# Patient Record
Sex: FEMALE | Race: WHITE | NOT HISPANIC OR LATINO | Employment: PART TIME | URBAN - NONMETROPOLITAN AREA
[De-identification: names, ages, dates, MRNs, and addresses within clinical notes are randomized per-mention and may not be internally consistent; named-entity substitution may affect disease eponyms.]

---
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
## 2020-01-17 ENCOUNTER — OFFICE VISIT (OUTPATIENT)
Dept: SURGERY | Facility: CLINIC | Age: 20
End: 2020-01-17

## 2020-01-17 VITALS
BODY MASS INDEX: 21.79 KG/M2 | RESPIRATION RATE: 16 BRPM | HEIGHT: 63 IN | DIASTOLIC BLOOD PRESSURE: 72 MMHG | WEIGHT: 123 LBS | HEART RATE: 72 BPM | SYSTOLIC BLOOD PRESSURE: 118 MMHG

## 2020-01-17 DIAGNOSIS — L98.8 DYSPLASTIC SKIN LESION: Primary | ICD-10-CM

## 2020-01-17 PROCEDURE — 99213 OFFICE O/P EST LOW 20 MIN: CPT | Performed by: SURGERY

## 2020-01-17 NOTE — PROGRESS NOTES
"Nikki Varghese 19 y.o. female presents as a self ref for eval skin lesion under LEFT breast, neck, and RIGHT thigh.  Pt states she has multiple skin lesions and moles and has never seen derm.  Pt would like a derm ref for full body scan.   Chief Complaint   Patient presents with   • Skin Lesion     MULTIPLE ENTIRE BODY             HPI   Above noted and agree.   These lesions have been changing and are causing irritation.  The chest lesion has grown.  They are different shades of brown.  She has no fevers or chills.  She has no other symptoms.      Review of Systems        No current outpatient medications on file.        No Known Allergies        Past Medical History:   Diagnosis Date   • Headache            Past Surgical History:   Procedure Laterality Date   • MOLE REMOVAL             Social History     Tobacco Use   • Smoking status: Never Smoker   • Smokeless tobacco: Never Used   Substance Use Topics   • Alcohol use: No   • Drug use: No             There is no immunization history on file for this patient.        Physical Exam   Constitutional: She is oriented to person, place, and time. She appears well-developed and well-nourished.   HENT:   Head: Normocephalic and atraumatic.   Right Ear: External ear normal.   Left Ear: External ear normal.   Neurological: She is alert and oriented to person, place, and time.   Skin:   Behind her neck, on her chest, and on her leg are dysplastic appearing skin lesions.     Psychiatric: She has a normal mood and affect. Her behavior is normal.       Debilities/Disabilities Identified: None    Emotional Behavior: Appropriate      /72   Pulse 72   Resp 16   Ht 160 cm (63\")   Wt 55.8 kg (123 lb)   BMI 21.79 kg/m²         Nikki was seen today for skin lesion.    Diagnoses and all orders for this visit:    Dysplastic skin lesion    As soon as we receive a PA, we will bring her back and excise them.    Thank you for allowing me to participate in the care of this " interesting patient.

## 2020-02-03 ENCOUNTER — PROCEDURE VISIT (OUTPATIENT)
Dept: SURGERY | Facility: CLINIC | Age: 20
End: 2020-02-03

## 2020-02-03 DIAGNOSIS — L98.8 DYSPLASTIC SKIN LESION: Primary | ICD-10-CM

## 2020-02-03 PROBLEM — R10.2 PELVIC PAIN: Status: RESOLVED | Noted: 2019-02-25 | Resolved: 2020-02-03

## 2020-02-03 PROBLEM — Z11.3 SCREEN FOR STD (SEXUALLY TRANSMITTED DISEASE): Status: RESOLVED | Noted: 2019-02-25 | Resolved: 2020-02-03

## 2020-02-03 PROCEDURE — 11400 EXC TR-EXT B9+MARG 0.5 CM<: CPT | Performed by: SURGERY

## 2020-02-03 PROCEDURE — 11200 RMVL SKIN TAGS UP TO&INC 15: CPT | Performed by: SURGERY

## 2020-02-03 NOTE — PROGRESS NOTES
Nikki Varghese 19 y.o. female presents for excision 3 skin lesions.  LEFT breast, RIGHT thigh, post neck.   Chief Complaint   Patient presents with   • Skin Lesion     LEFT breast, RIGHT thigh, post neck             HPI   Above noted and agree.  The neck lesion is a skin tag.  The abdomen and thigh lesions have changed in size and color.        Review of Systems        No current outpatient medications on file.        No Known Allergies        Past Medical History:   Diagnosis Date   • Headache            Past Surgical History:   Procedure Laterality Date   • MOLE REMOVAL             Social History     Tobacco Use   • Smoking status: Never Smoker   • Smokeless tobacco: Never Used   Substance Use Topics   • Alcohol use: No   • Drug use: No             There is no immunization history on file for this patient.        Physical Exam     I discussed with the patient the benefits and risks of excising this lesion.   Risks not limited to but including bleeding, infection, having to excise more if the lesion turns out to be cancer.  The patient appeared to understand and signed informed consent.  The area was prepped and draped in the usual sterile fashion.  Local was injected into the area to be excised.  The lesion was then excised.  Hemostasis was perfected.  The wound was then closed using simple sutures.  Sterile dressings were applied.  The patient tolerated the procedure well without complication.  Wound care instructions were then given to the patient.  The abdomen lesion measured 0.5 cm x 0.5 cm x 0.5 cm and the thigh lesion measured 0.5 cm x 0.5 cm x 0.5 cm.  The skin tag on the neck was < 0.2 cm.    Debilities/Disabilities Identified: None    Emotional Behavior: Appropriate      There were no vitals taken for this visit.        Nikki was seen today for skin lesion.    Diagnoses and all orders for this visit:    Dysplastic skin lesion    Thank you for allowing me to participate in the care of this  interesting patient.

## 2020-02-06 LAB
DX ICD CODE: NORMAL
PATH REPORT.FINAL DX SPEC: NORMAL
PATH REPORT.GROSS SPEC: NORMAL
PATH REPORT.SITE OF ORIGIN SPEC: NORMAL
PATHOLOGIST NAME: NORMAL
PAYMENT PROCEDURE: NORMAL

## 2020-02-07 ENCOUNTER — TELEPHONE (OUTPATIENT)
Dept: SURGERY | Facility: CLINIC | Age: 20
End: 2020-02-07

## 2020-02-07 NOTE — TELEPHONE ENCOUNTER
"Nikki called the office this morning regarding her stitches that she was told she could take out today.  She said that she didn't feel like they were 100% ready to be taken out and wanted to know if she could have one of our(mine, Dr. Cerda or Emily Campos) cell phone numbers so she could send a picture of the stitches and us look to see if they what thought.  She said that she has had stitches before and these dont look as healed as the others she has had in the past. She said she wasn't able to come in the office today because she has to work from 1030am-7pm. I explained to her that Dr. Stallings and Emily Medina were at another location and that I would contact them and call her back.    Before I was able to get a response the patient called back saying she needed to get ready for work and wanted to know what she should do before getting fully ready.  She again requested if she could have my cell # and send my a picture, not as a patient to staff, but just as a friend and wanted me to tell her my thoughts on it.  I explained to her that we arent allowed to give our cell #'s out, she said \"well why your office has mine\".   With asking the patient, I placed her on hold and contacted Dr. Stallings and explained the situation.    After speaking with Dr. Stallings she had me advise the patient that she could wait until Sunday or Monday to remove the stitches and come Monday if she still questionable about them, she could call our office and we would see her on Monday.    Nikki was made aware of this and understood and agreeable.  "

## 2022-08-24 ENCOUNTER — HOSPITAL ENCOUNTER (EMERGENCY)
Facility: HOSPITAL | Age: 22
Discharge: HOME | End: 2022-08-24
Attending: EMERGENCY MEDICINE | Admitting: EMERGENCY MEDICINE

## 2022-08-24 ENCOUNTER — APPOINTMENT (OUTPATIENT)
Dept: CT IMAGING | Facility: HOSPITAL | Age: 22
End: 2022-08-24

## 2022-08-24 VITALS
DIASTOLIC BLOOD PRESSURE: 57 MMHG | BODY MASS INDEX: 22.86 KG/M2 | SYSTOLIC BLOOD PRESSURE: 98 MMHG | RESPIRATION RATE: 16 BRPM | WEIGHT: 129 LBS | HEIGHT: 63 IN | HEART RATE: 72 BPM | TEMPERATURE: 98.5 F | OXYGEN SATURATION: 99 %

## 2022-08-24 DIAGNOSIS — R10.9 RIGHT SIDED ABDOMINAL PAIN: Primary | ICD-10-CM

## 2022-08-24 LAB
ALBUMIN SERPL-MCNC: 4.8 G/DL (ref 3.5–5.2)
ALBUMIN/GLOB SERPL: 2.5 G/DL
ALP SERPL-CCNC: 30 U/L (ref 39–117)
ALT SERPL W P-5'-P-CCNC: 12 U/L (ref 1–33)
ANION GAP SERPL CALCULATED.3IONS-SCNC: 11 MMOL/L (ref 5–15)
AST SERPL-CCNC: 19 U/L (ref 1–32)
BASOPHILS # BLD AUTO: 0.05 10*3/MM3 (ref 0–0.2)
BASOPHILS NFR BLD AUTO: 0.7 % (ref 0–1.5)
BILIRUB SERPL-MCNC: 1 MG/DL (ref 0–1.2)
BILIRUB UR QL STRIP: NEGATIVE
BUN SERPL-MCNC: 7 MG/DL (ref 6–20)
BUN/CREAT SERPL: 8.8 (ref 7–25)
CALCIUM SPEC-SCNC: 9.4 MG/DL (ref 8.6–10.5)
CHLORIDE SERPL-SCNC: 104 MMOL/L (ref 98–107)
CLARITY UR: CLEAR
CO2 SERPL-SCNC: 24 MMOL/L (ref 22–29)
COLOR UR: YELLOW
CREAT SERPL-MCNC: 0.8 MG/DL (ref 0.57–1)
DEPRECATED RDW RBC AUTO: 41.3 FL (ref 37–54)
EGFRCR SERPLBLD CKD-EPI 2021: 107 ML/MIN/1.73
EOSINOPHIL # BLD AUTO: 0.16 10*3/MM3 (ref 0–0.4)
EOSINOPHIL NFR BLD AUTO: 2.4 % (ref 0.3–6.2)
ERYTHROCYTE [DISTWIDTH] IN BLOOD BY AUTOMATED COUNT: 12.4 % (ref 12.3–15.4)
GLOBULIN UR ELPH-MCNC: 1.9 GM/DL
GLUCOSE SERPL-MCNC: 93 MG/DL (ref 65–99)
GLUCOSE UR STRIP-MCNC: NEGATIVE MG/DL
HCG SERPL QL: NEGATIVE
HCT VFR BLD AUTO: 40.1 % (ref 34–46.6)
HGB BLD-MCNC: 13.1 G/DL (ref 12–15.9)
HGB UR QL STRIP.AUTO: NEGATIVE
IMM GRANULOCYTES # BLD AUTO: 0.01 10*3/MM3 (ref 0–0.05)
IMM GRANULOCYTES NFR BLD AUTO: 0.1 % (ref 0–0.5)
KETONES UR QL STRIP: ABNORMAL
LEUKOCYTE ESTERASE UR QL STRIP.AUTO: NEGATIVE
LIPASE SERPL-CCNC: 68 U/L (ref 13–60)
LYMPHOCYTES # BLD AUTO: 2.4 10*3/MM3 (ref 0.7–3.1)
LYMPHOCYTES NFR BLD AUTO: 35.4 % (ref 19.6–45.3)
MCH RBC QN AUTO: 29.6 PG (ref 26.6–33)
MCHC RBC AUTO-ENTMCNC: 32.7 G/DL (ref 31.5–35.7)
MCV RBC AUTO: 90.7 FL (ref 79–97)
MONOCYTES # BLD AUTO: 0.59 10*3/MM3 (ref 0.1–0.9)
MONOCYTES NFR BLD AUTO: 8.7 % (ref 5–12)
NEUTROPHILS NFR BLD AUTO: 3.57 10*3/MM3 (ref 1.7–7)
NEUTROPHILS NFR BLD AUTO: 52.7 % (ref 42.7–76)
NITRITE UR QL STRIP: NEGATIVE
NRBC BLD AUTO-RTO: 0 /100 WBC (ref 0–0.2)
PH UR STRIP.AUTO: 6.5 [PH] (ref 5–8)
PLATELET # BLD AUTO: 231 10*3/MM3 (ref 140–450)
PMV BLD AUTO: 11 FL (ref 6–12)
POTASSIUM SERPL-SCNC: 3.7 MMOL/L (ref 3.5–5.2)
PROT SERPL-MCNC: 6.7 G/DL (ref 6–8.5)
PROT UR QL STRIP: ABNORMAL
RBC # BLD AUTO: 4.42 10*6/MM3 (ref 3.77–5.28)
SODIUM SERPL-SCNC: 139 MMOL/L (ref 136–145)
SP GR UR STRIP: 1.03 (ref 1–1.03)
UROBILINOGEN UR QL STRIP: ABNORMAL
WBC NRBC COR # BLD: 6.78 10*3/MM3 (ref 3.4–10.8)

## 2022-08-24 PROCEDURE — 80053 COMPREHEN METABOLIC PANEL: CPT | Performed by: EMERGENCY MEDICINE

## 2022-08-24 PROCEDURE — 85025 COMPLETE CBC W/AUTO DIFF WBC: CPT | Performed by: EMERGENCY MEDICINE

## 2022-08-24 PROCEDURE — 81003 URINALYSIS AUTO W/O SCOPE: CPT | Performed by: EMERGENCY MEDICINE

## 2022-08-24 PROCEDURE — 74177 CT ABD & PELVIS W/CONTRAST: CPT

## 2022-08-24 PROCEDURE — 83690 ASSAY OF LIPASE: CPT | Performed by: EMERGENCY MEDICINE

## 2022-08-24 PROCEDURE — 84703 CHORIONIC GONADOTROPIN ASSAY: CPT | Performed by: EMERGENCY MEDICINE

## 2022-08-24 PROCEDURE — 99283 EMERGENCY DEPT VISIT LOW MDM: CPT

## 2022-08-24 PROCEDURE — 25010000002 IOPAMIDOL 61 % SOLUTION: Performed by: EMERGENCY MEDICINE

## 2022-08-24 RX ORDER — SODIUM CHLORIDE 0.9 % (FLUSH) 0.9 %
10 SYRINGE (ML) INJECTION AS NEEDED
Status: DISCONTINUED | OUTPATIENT
Start: 2022-08-24 | End: 2022-08-24 | Stop reason: HOSPADM

## 2022-08-24 RX ADMIN — IOPAMIDOL 85 ML: 612 INJECTION, SOLUTION INTRAVENOUS at 10:06

## 2022-08-24 NOTE — ED PROVIDER NOTES
EMERGENCY DEPARTMENT ENCOUNTER    Room Number:  40/40  Date of encounter:  8/24/2022  PCP: Lamont Christian DO  Historian: Patient      HPI:  Chief Complaint: Abdominal pain      Context: Nikki Varghese is a 22 y.o. female who presents to the ED c/o acute onset of lower abdominal pain that radiated to her right side into her right shoulder blade this morning.  She states that initially it was severe but currently has resolved.  The patient's states that she knows she has a 4 cm right ovarian cyst and is worried that this is what has caused her pain.  Patient denies fevers, chills, nausea, vomiting, diarrhea, hematuria or dysuria      PAST MEDICAL HISTORY  Active Ambulatory Problems     Diagnosis Date Noted   • No Active Ambulatory Problems     Resolved Ambulatory Problems     Diagnosis Date Noted   • Pelvic pain 02/25/2019   • Screen for STD (sexually transmitted disease) 02/25/2019     Past Medical History:   Diagnosis Date   • Headache          PAST SURGICAL HISTORY  Past Surgical History:   Procedure Laterality Date   • MOLE REMOVAL           FAMILY HISTORY  Family History   Problem Relation Age of Onset   • No Known Problems Father    • No Known Problems Mother    • Breast cancer Maternal Great-Grandmother    • Ovarian cancer Neg Hx    • Colon cancer Neg Hx    • Uterine cancer Neg Hx          SOCIAL HISTORY  Social History     Socioeconomic History   • Marital status: Single   Tobacco Use   • Smoking status: Never Smoker   • Smokeless tobacco: Never Used   Substance and Sexual Activity   • Alcohol use: No   • Drug use: No   • Sexual activity: Yes     Partners: Male     Birth control/protection: Coitus interruptus         ALLERGIES  Patient has no known allergies.        REVIEW OF SYSTEMS  Review of Systems     The patient has headache, neck pain, chest pain, fevers, chills, cough or shortness of breath  All systems reviewed and negative except for those discussed in HPI.     PHYSICAL EXAM    I have  reviewed the triage vital signs and nursing notes.    ED Triage Vitals   Temp Heart Rate Resp BP SpO2   08/24/22 0752 08/24/22 0752 08/24/22 0752 08/24/22 0836 08/24/22 0752   98.5 °F (36.9 °C) 97 16 102/61 97 %      Temp src Heart Rate Source Patient Position BP Location FiO2 (%)   -- -- -- -- --              GENERAL: 22-year-old well developed, well nourished in no acute distress  HENT: NCAT, neck supple, trachea midline  EYES: no scleral icterus, PERRL, normal conjunctivae  CV: regular rhythm, regular rate, no murmur  RESPIRATORY: unlabored effort, CTAB  ABDOMEN: soft, nontender, nondistended, bowel sounds present  MUSCULOSKELETAL: no gross deformity, no pedal edema, no calf tenderness  NEURO: alert,  sensory and motor function of extremities intact, speech clear, mental status normal  SKIN: warm, dry, no rash  PSYCH:  Appropriate mood and affect      PPE  Pt does not present with symptoms for COVID19; however, I was wearing a mask and goggles throughout all patient interaction.    Vital signs and nursing notes reviewed.      LAB RESULTS  Recent Results (from the past 24 hour(s))   Comprehensive Metabolic Panel    Collection Time: 08/24/22  8:03 AM    Specimen: Blood   Result Value Ref Range    Glucose 93 65 - 99 mg/dL    BUN 7 6 - 20 mg/dL    Creatinine 0.80 0.57 - 1.00 mg/dL    Sodium 139 136 - 145 mmol/L    Potassium 3.7 3.5 - 5.2 mmol/L    Chloride 104 98 - 107 mmol/L    CO2 24.0 22.0 - 29.0 mmol/L    Calcium 9.4 8.6 - 10.5 mg/dL    Total Protein 6.7 6.0 - 8.5 g/dL    Albumin 4.80 3.50 - 5.20 g/dL    ALT (SGPT) 12 1 - 33 U/L    AST (SGOT) 19 1 - 32 U/L    Alkaline Phosphatase 30 (L) 39 - 117 U/L    Total Bilirubin 1.0 0.0 - 1.2 mg/dL    Globulin 1.9 gm/dL    A/G Ratio 2.5 g/dL    BUN/Creatinine Ratio 8.8 7.0 - 25.0    Anion Gap 11.0 5.0 - 15.0 mmol/L    eGFR 107.0 >60.0 mL/min/1.73   Lipase    Collection Time: 08/24/22  8:03 AM    Specimen: Blood   Result Value Ref Range    Lipase 68 (H) 13 - 60 U/L   hCG,  Serum, Qualitative    Collection Time: 08/24/22  8:03 AM    Specimen: Blood   Result Value Ref Range    HCG Qualitative Negative Negative   CBC Auto Differential    Collection Time: 08/24/22  8:03 AM    Specimen: Blood   Result Value Ref Range    WBC 6.78 3.40 - 10.80 10*3/mm3    RBC 4.42 3.77 - 5.28 10*6/mm3    Hemoglobin 13.1 12.0 - 15.9 g/dL    Hematocrit 40.1 34.0 - 46.6 %    MCV 90.7 79.0 - 97.0 fL    MCH 29.6 26.6 - 33.0 pg    MCHC 32.7 31.5 - 35.7 g/dL    RDW 12.4 12.3 - 15.4 %    RDW-SD 41.3 37.0 - 54.0 fl    MPV 11.0 6.0 - 12.0 fL    Platelets 231 140 - 450 10*3/mm3    Neutrophil % 52.7 42.7 - 76.0 %    Lymphocyte % 35.4 19.6 - 45.3 %    Monocyte % 8.7 5.0 - 12.0 %    Eosinophil % 2.4 0.3 - 6.2 %    Basophil % 0.7 0.0 - 1.5 %    Immature Grans % 0.1 0.0 - 0.5 %    Neutrophils, Absolute 3.57 1.70 - 7.00 10*3/mm3    Lymphocytes, Absolute 2.40 0.70 - 3.10 10*3/mm3    Monocytes, Absolute 0.59 0.10 - 0.90 10*3/mm3    Eosinophils, Absolute 0.16 0.00 - 0.40 10*3/mm3    Basophils, Absolute 0.05 0.00 - 0.20 10*3/mm3    Immature Grans, Absolute 0.01 0.00 - 0.05 10*3/mm3    nRBC 0.0 0.0 - 0.2 /100 WBC   Urinalysis With Microscopic If Indicated (No Culture) - Urine, Clean Catch    Collection Time: 08/24/22  8:56 AM    Specimen: Urine, Clean Catch   Result Value Ref Range    Color, UA Yellow Yellow, Straw    Appearance, UA Clear Clear    pH, UA 6.5 5.0 - 8.0    Specific Gravity, UA 1.029 1.005 - 1.030    Glucose, UA Negative Negative    Ketones, UA Trace (A) Negative    Bilirubin, UA Negative Negative    Blood, UA Negative Negative    Protein, UA Trace (A) Negative    Leuk Esterase, UA Negative Negative    Nitrite, UA Negative Negative    Urobilinogen, UA 1.0 E.U./dL 0.2 - 1.0 E.U./dL       Ordered the above labs and independently reviewed the results.        RADIOLOGY  CT Abdomen Pelvis With Contrast    Result Date: 8/24/2022  CT ABDOMEN AND PELVIS WITH CONTRAST  HISTORY: Right upper quadrant and right lower quadrant  pain.  TECHNIQUE: Axial CT images of the abdomen and pelvis were obtained following administration of intravenous contrast. The patient was not given oral contrast Coronal and sagittal reformats were obtained.  COMPARISON: None  FINDINGS: The liver is unremarkable. The gallbladder is normal without wall thickening. The spleen is unremarkable on CT without focal lesion. The pancreas is normal without ductal dilatation. Bilateral adrenal glands are normal. No renal calculi or hydronephrosis. No suspicious renal mass. The urinary bladder is minimally distended limiting evaluation. The uterus is anteverted and unremarkable for age. No abnormal adnexal masses seen. A vaginal tampon is in place. Small amount of layering fluid is seen within the pelvis.  The small and large bowel loops demonstrate normal caliber. Only a portion of the appendix is delineated and seems to contain air best demonstrated on axial image 92 series 2. Appendicitis is thus not considered likely. There is no pathological retroperitoneal lymphadenopathy.      Small amount of layering fluid within the pelvis. Only a portion of the appendix is delineated and contains intraluminal air thus appendicitis is not favored.  These findings were discussed with Dr. Harvey by telephone.  Radiation dose reduction techniques were utilized, including automated exposure control and exposure modulation based on body size.         I ordered the above noted radiological studies. Independently reviewed by me and discussed with radiologist.  See dictation above for official radiology interpretation.      PROCEDURES    Procedures        MEDICATIONS GIVEN IN ER    Medications   iopamidol (ISOVUE-300) 61 % injection 100 mL (85 mL Intravenous Given by Other 8/24/22 1006)         PROGRESS, DATA ANALYSIS, CONSULTS, AND MEDICAL DECISION MAKING    All labs have been independently reviewed by me.  All radiology studies have been reviewed by me and discussed with radiologist  dictating report.   EKG's independently reviewed by me.  Discussion below represents my analysis of pertinent findings related to patient's condition, differential diagnosis, treatment plan and final disposition.      ED Course as of 08/24/22 1539   Wed Aug 24, 2022   0932 On repeat examination the patient is now complaining of right upper quadrant pain.  The patient states that she has been seen by GI and GYN for abdominal pain in the past.  The patient is requesting abdominal CT scan for further evaluation which I believe is appropriate with her history and physical exam. [GP]   1118 On repeat exam the patient is resting company in the bed and denies pain.  She states she had no right ovarian cyst that was 4 cm.  Currently we do not see a cyst on the CT scan but the patient does have some free fluid in the pelvis.  Her history, physical and work-up are consistent with a ruptured ovarian cyst.  I believe patient is stable for discharge home with Tylenol or Motrin for pain.  I have discussed this with the patient and she understands and agrees to the plan. [GP]      ED Course User Index  [GP] Yann Harvey MD           The differential diagnosis includes but is not limited to gastritis, pancreatitis, cholecystitis, appendicitis, diverticulitis, urinary tract infection, kidney stone, or bowel obstruction.          AS OF 15:39 EDT VITALS:    BP - 98/57  HR - 72  TEMP - 98.5 °F (36.9 °C)  02 SATS - 99%        DIAGNOSIS  Final diagnoses:   Right sided abdominal pain: Likely ruptured ovarian cyst         DISPOSITION  DISCHARGE    Patient discharged in stable condition.    Reviewed implications of results, diagnosis, meds, responsibility to follow up, warning signs and symptoms of possible worsening, potential complications and reasons to return to ER, including increased pain, fever or vomiting.    Patient/Family voiced understanding of above instructions.    Discussed plan for discharge, as there is no emergent  indication for admission.  Pt/family is agreeable and understands need for follow up and repeat testing.  Pt is aware that discharge does not mean that nothing is wrong but it indicates no emergency is present and they must continue care with follow-up as given below or physician of their choice.     FOLLOW-UP  Lamont Christian, DO  [address] [place] [address] [phone]    In 2 days  If symptoms worsen              EMR Dragon/Transcription disclaimer:   Much of this encounter note is an electronic transcription/translation of spoken language to printed text.        Yann Harvey MD  08/24/22 4624

## 2022-08-24 NOTE — ED NOTES
Patient from home via [facility]; c/o abdominal pain that started this morning. Denies any n/v/d. Hx of right ovarian cyst and hernia.

## 2022-08-24 NOTE — DISCHARGE INSTRUCTIONS
Go home and rest today.  Use Tylenol or Motrin for pain.  Follow-up your doctor if not better in 2 days or return if worse.

## 2024-06-04 ENCOUNTER — TRANSCRIBE ORDERS (OUTPATIENT)
Dept: ADMINISTRATIVE | Facility: HOSPITAL | Age: 24
End: 2024-06-04
Payer: COMMERCIAL

## 2024-06-04 ENCOUNTER — HOSPITAL ENCOUNTER (OUTPATIENT)
Dept: GENERAL RADIOLOGY | Facility: HOSPITAL | Age: 24
Discharge: HOME | End: 2024-06-04
Payer: COMMERCIAL

## 2024-06-04 DIAGNOSIS — R07.9 CHEST PAIN, UNSPECIFIED TYPE: ICD-10-CM

## 2024-06-04 DIAGNOSIS — R07.9 CHEST PAIN, UNSPECIFIED TYPE: Primary | ICD-10-CM

## 2024-06-04 PROCEDURE — 71046 X-RAY EXAM CHEST 2 VIEWS: CPT

## 2024-06-12 ENCOUNTER — HOSPITAL ENCOUNTER (OUTPATIENT)
Dept: CARDIOLOGY | Facility: HOSPITAL | Age: 24
Discharge: HOME | End: 2024-06-12
Admitting: INTERNAL MEDICINE
Payer: COMMERCIAL

## 2024-06-12 ENCOUNTER — TRANSCRIBE ORDERS (OUTPATIENT)
Dept: ADMINISTRATIVE | Facility: HOSPITAL | Age: 24
End: 2024-06-12
Payer: COMMERCIAL

## 2024-06-12 DIAGNOSIS — M79.604 PAIN IN BOTH LOWER EXTREMITIES: Primary | ICD-10-CM

## 2024-06-12 DIAGNOSIS — M79.605 PAIN IN BOTH LOWER EXTREMITIES: ICD-10-CM

## 2024-06-12 DIAGNOSIS — M79.604 PAIN IN BOTH LOWER EXTREMITIES: ICD-10-CM

## 2024-06-12 DIAGNOSIS — R07.9 CHEST PAIN, UNSPECIFIED TYPE: ICD-10-CM

## 2024-06-12 DIAGNOSIS — M79.605 PAIN IN BOTH LOWER EXTREMITIES: Primary | ICD-10-CM

## 2024-06-12 LAB

## 2024-06-12 PROCEDURE — 93970 EXTREMITY STUDY: CPT | Performed by: SURGERY

## 2024-06-12 PROCEDURE — 93970 EXTREMITY STUDY: CPT

## 2025-02-07 ENCOUNTER — OFFICE VISIT (OUTPATIENT)
Dept: SURGERY | Facility: CLINIC | Age: 25
End: 2025-02-07
Payer: COMMERCIAL

## 2025-02-07 VITALS
RESPIRATION RATE: 16 BRPM | DIASTOLIC BLOOD PRESSURE: 64 MMHG | HEART RATE: 72 BPM | SYSTOLIC BLOOD PRESSURE: 100 MMHG | HEIGHT: 63 IN | BODY MASS INDEX: 23.74 KG/M2 | WEIGHT: 134 LBS

## 2025-02-07 DIAGNOSIS — L98.8 DYSPLASTIC SKIN LESION: Primary | ICD-10-CM

## 2025-02-07 PROCEDURE — 99203 OFFICE O/P NEW LOW 30 MIN: CPT | Performed by: SURGERY

## 2025-02-07 RX ORDER — CITALOPRAM HYDROBROMIDE 40 MG/1
40 TABLET ORAL DAILY
COMMUNITY

## 2025-02-07 NOTE — LETTER
February 7, 2025     Jimbo Lopez MD  [address] [place], [place] [id number]    Patient: Nikki Varghese   YOB: 2000   Date of Visit: 2/7/2025     Dear Jimbo Lopez:       Thank you for referring Nikki Varghese to me for evaluation. Below are the relevant portions of my assessment and plan of care.    If you have questions, please do not hesitate to call me. I look forward to following Nikki along with you.         Sincerely,        Angela Stallings DO        CC: No Recipients    Angela Stallings DO  02/07/25 1041  Sign when Signing Visit  Nikki Varghese 24 y.o. female presents as a self ref for eval of 5 skin lesions on back.  Pt reports they bleed when washing or scratching her back and would like to discuss excision.          HPI   Above noted agree.  Anesthesia is well-known to my service.  I removed skin lesions approximately 5 years ago.  She is here with multiple dysplastic lesions on her back that she would like removed.      Review of Systems   All other systems reviewed and are negative.            Current Outpatient Medications:   •  citalopram (CeleXA) 40 MG tablet, Take 1 tablet by mouth Daily., Disp: , Rfl:         No Known Allergies        Past Medical History:   Diagnosis Date   • Headache            Past Surgical History:   Procedure Laterality Date   • MOLE REMOVAL             Social History     Tobacco Use   • Smoking status: Never   • Smokeless tobacco: Never   Substance Use Topics   • Alcohol use: No   • Drug use: No           Immunization History   Administered Date(s) Administered   • COVID-19 (PFIZER) Purple Cap Monovalent 08/12/2021, 09/02/2021           Physical Exam  Vitals and nursing note reviewed.   Constitutional:       Appearance: Normal appearance.   Cardiovascular:      Rate and Rhythm: Normal rate and regular rhythm.   Pulmonary:      Effort: Pulmonary effort is normal.      Breath sounds: Normal breath sounds.   Skin:      "Comments: Nikki has greater than 5 dysplastic appearing lesions on her back varying in size from 1 cm to 0.5 cm.   Neurological:      General: No focal deficit present.      Mental Status: She is alert and oriented to person, place, and time.   Psychiatric:         Mood and Affect: Mood normal.         Behavior: Behavior normal.         Debilities/Disabilities Identified: None    Emotional Behavior: Appropriate      /64   Pulse 72   Resp 16   Ht 160 cm (63\")   Wt 60.8 kg (134 lb)   BMI 23.74 kg/m²         Diagnoses and all orders for this visit:    1. Dysplastic skin lesion (Primary)    Due to the large number of lesions we discussed excising them in surgery where we can use sedation.  We discussed the benefits and risks.  All of her questions were answered and she is willing to proceed.    Thank you for allowing me to participate in the care of this interesting patient.         "

## 2025-02-07 NOTE — PROGRESS NOTES
"Nikki Varghese 24 y.o. female presents as a self ref for eval of 5 skin lesions on back.  Pt reports they bleed when washing or scratching her back and would like to discuss excision.          HPI   Above noted agree.  Anesthesia is well-known to my service.  I removed skin lesions approximately 5 years ago.  She is here with multiple dysplastic lesions on her back that she would like removed.      Review of Systems   All other systems reviewed and are negative.            Current Outpatient Medications:     citalopram (CeleXA) 40 MG tablet, Take 1 tablet by mouth Daily., Disp: , Rfl:         No Known Allergies        Past Medical History:   Diagnosis Date    Headache            Past Surgical History:   Procedure Laterality Date    MOLE REMOVAL             Social History     Tobacco Use    Smoking status: Never    Smokeless tobacco: Never   Substance Use Topics    Alcohol use: No    Drug use: No           Immunization History   Administered Date(s) Administered    COVID-19 (PFIZER) Purple Cap Monovalent 08/12/2021, 09/02/2021           Physical Exam  Vitals and nursing note reviewed.   Constitutional:       Appearance: Normal appearance.   Cardiovascular:      Rate and Rhythm: Normal rate and regular rhythm.   Pulmonary:      Effort: Pulmonary effort is normal.      Breath sounds: Normal breath sounds.   Skin:     Comments: Nikki has greater than 5 dysplastic appearing lesions on her back varying in size from 1 cm to 0.5 cm.   Neurological:      General: No focal deficit present.      Mental Status: She is alert and oriented to person, place, and time.   Psychiatric:         Mood and Affect: Mood normal.         Behavior: Behavior normal.         Debilities/Disabilities Identified: None    Emotional Behavior: Appropriate      /64   Pulse 72   Resp 16   Ht 160 cm (63\")   Wt 60.8 kg (134 lb)   BMI 23.74 kg/m²         Diagnoses and all orders for this visit:    1. Dysplastic skin lesion " (Primary)    Due to the large number of lesions we discussed excising them in surgery where we can use sedation.  We discussed the benefits and risks.  All of her questions were answered and she is willing to proceed.    Thank you for allowing me to participate in the care of this interesting patient.

## 2025-03-12 ENCOUNTER — ANESTHESIA EVENT (OUTPATIENT)
Dept: PERIOP | Facility: HOSPITAL | Age: 25
End: 2025-03-12
Payer: COMMERCIAL

## 2025-03-13 ENCOUNTER — HOSPITAL ENCOUNTER (OUTPATIENT)
Facility: HOSPITAL | Age: 25
Setting detail: HOSPITAL OUTPATIENT SURGERY
Discharge: HOME | End: 2025-03-13
Attending: SURGERY | Admitting: SURGERY
Payer: COMMERCIAL

## 2025-03-13 ENCOUNTER — ANESTHESIA (OUTPATIENT)
Dept: PERIOP | Facility: HOSPITAL | Age: 25
End: 2025-03-13
Payer: COMMERCIAL

## 2025-03-13 VITALS
OXYGEN SATURATION: 99 % | SYSTOLIC BLOOD PRESSURE: 96 MMHG | BODY MASS INDEX: 24.3 KG/M2 | WEIGHT: 137.2 LBS | HEART RATE: 82 BPM | RESPIRATION RATE: 16 BRPM | TEMPERATURE: 98.1 F | DIASTOLIC BLOOD PRESSURE: 55 MMHG

## 2025-03-13 DIAGNOSIS — L98.8 DYSPLASTIC SKIN LESION: ICD-10-CM

## 2025-03-13 LAB — HCG SERPL QL: NEGATIVE

## 2025-03-13 PROCEDURE — 88305 TISSUE EXAM BY PATHOLOGIST: CPT | Performed by: SURGERY

## 2025-03-13 PROCEDURE — 11401 EXC TR-EXT B9+MARG 0.6-1 CM: CPT | Performed by: SURGERY

## 2025-03-13 PROCEDURE — 25010000002 CEFAZOLIN PER 500 MG: Performed by: SURGERY

## 2025-03-13 PROCEDURE — 25810000003 LACTATED RINGERS PER 1000 ML

## 2025-03-13 PROCEDURE — 11402 EXC TR-EXT B9+MARG 1.1-2 CM: CPT | Performed by: SURGERY

## 2025-03-13 PROCEDURE — 25010000002 BUPIVACAINE (PF) 0.5 % SOLUTION 30 ML VIAL: Performed by: SURGERY

## 2025-03-13 PROCEDURE — 25010000002 LIDOCAINE 1% - EPINEPHRINE 1:100000 1 %-1:100000 SOLUTION 20 ML VIAL: Performed by: SURGERY

## 2025-03-13 PROCEDURE — 25010000002 DEXAMETHASONE PER 1 MG

## 2025-03-13 PROCEDURE — 25010000002 PROPOFOL 200 MG/20ML EMULSION: Performed by: NURSE ANESTHETIST, CERTIFIED REGISTERED

## 2025-03-13 PROCEDURE — 84703 CHORIONIC GONADOTROPIN ASSAY: CPT

## 2025-03-13 PROCEDURE — 25010000002 MIDAZOLAM PER 1MG

## 2025-03-13 PROCEDURE — 25010000002 FENTANYL CITRATE (PF) 50 MCG/ML SOLUTION: Performed by: NURSE ANESTHETIST, CERTIFIED REGISTERED

## 2025-03-13 PROCEDURE — 25010000002 ONDANSETRON PER 1 MG

## 2025-03-13 PROCEDURE — 25010000002 LIDOCAINE 2% SOLUTION: Performed by: NURSE ANESTHETIST, CERTIFIED REGISTERED

## 2025-03-13 PROCEDURE — 25010000002 KETOROLAC TROMETHAMINE PER 15 MG: Performed by: NURSE ANESTHETIST, CERTIFIED REGISTERED

## 2025-03-13 RX ORDER — LIDOCAINE HYDROCHLORIDE 10 MG/ML
0.5 INJECTION, SOLUTION EPIDURAL; INFILTRATION; INTRACAUDAL; PERINEURAL ONCE AS NEEDED
Status: DISCONTINUED | OUTPATIENT
Start: 2025-03-13 | End: 2025-03-13 | Stop reason: HOSPADM

## 2025-03-13 RX ORDER — DEXAMETHASONE SODIUM PHOSPHATE 4 MG/ML
8 INJECTION, SOLUTION INTRA-ARTICULAR; INTRALESIONAL; INTRAMUSCULAR; INTRAVENOUS; SOFT TISSUE ONCE AS NEEDED
Status: COMPLETED | OUTPATIENT
Start: 2025-03-13 | End: 2025-03-13

## 2025-03-13 RX ORDER — SODIUM CHLORIDE 0.9 % (FLUSH) 0.9 %
10 SYRINGE (ML) INJECTION EVERY 12 HOURS SCHEDULED
Status: DISCONTINUED | OUTPATIENT
Start: 2025-03-13 | End: 2025-03-13 | Stop reason: HOSPADM

## 2025-03-13 RX ORDER — KETAMINE HYDROCHLORIDE 10 MG/ML
INJECTION, SOLUTION INTRAMUSCULAR; INTRAVENOUS AS NEEDED
Status: DISCONTINUED | OUTPATIENT
Start: 2025-03-13 | End: 2025-03-13 | Stop reason: SURG

## 2025-03-13 RX ORDER — ONDANSETRON 2 MG/ML
4 INJECTION INTRAMUSCULAR; INTRAVENOUS ONCE AS NEEDED
Status: COMPLETED | OUTPATIENT
Start: 2025-03-13 | End: 2025-03-13

## 2025-03-13 RX ORDER — DIAPER,BRIEF,INFANT-TODD,DISP
EACH MISCELLANEOUS AS NEEDED
Status: DISCONTINUED | OUTPATIENT
Start: 2025-03-13 | End: 2025-03-13 | Stop reason: HOSPADM

## 2025-03-13 RX ORDER — FAMOTIDINE 10 MG/ML
20 INJECTION, SOLUTION INTRAVENOUS
Status: COMPLETED | OUTPATIENT
Start: 2025-03-13 | End: 2025-03-13

## 2025-03-13 RX ORDER — SODIUM CHLORIDE, SODIUM LACTATE, POTASSIUM CHLORIDE, CALCIUM CHLORIDE 600; 310; 30; 20 MG/100ML; MG/100ML; MG/100ML; MG/100ML
100 INJECTION, SOLUTION INTRAVENOUS CONTINUOUS
Status: DISCONTINUED | OUTPATIENT
Start: 2025-03-13 | End: 2025-03-13 | Stop reason: HOSPADM

## 2025-03-13 RX ORDER — KETOROLAC TROMETHAMINE 30 MG/ML
INJECTION, SOLUTION INTRAMUSCULAR; INTRAVENOUS AS NEEDED
Status: DISCONTINUED | OUTPATIENT
Start: 2025-03-13 | End: 2025-03-13 | Stop reason: SURG

## 2025-03-13 RX ORDER — SODIUM CHLORIDE 9 MG/ML
40 INJECTION, SOLUTION INTRAVENOUS AS NEEDED
Status: DISCONTINUED | OUTPATIENT
Start: 2025-03-13 | End: 2025-03-13 | Stop reason: HOSPADM

## 2025-03-13 RX ORDER — SODIUM CHLORIDE 0.9 % (FLUSH) 0.9 %
10 SYRINGE (ML) INJECTION AS NEEDED
Status: DISCONTINUED | OUTPATIENT
Start: 2025-03-13 | End: 2025-03-13 | Stop reason: HOSPADM

## 2025-03-13 RX ORDER — PROPOFOL 10 MG/ML
INJECTION, EMULSION INTRAVENOUS CONTINUOUS PRN
Status: DISCONTINUED | OUTPATIENT
Start: 2025-03-13 | End: 2025-03-13 | Stop reason: SURG

## 2025-03-13 RX ORDER — MIDAZOLAM HYDROCHLORIDE 2 MG/2ML
1 INJECTION, SOLUTION INTRAMUSCULAR; INTRAVENOUS
Status: DISCONTINUED | OUTPATIENT
Start: 2025-03-13 | End: 2025-03-13 | Stop reason: HOSPADM

## 2025-03-13 RX ORDER — SODIUM CHLORIDE, SODIUM LACTATE, POTASSIUM CHLORIDE, CALCIUM CHLORIDE 600; 310; 30; 20 MG/100ML; MG/100ML; MG/100ML; MG/100ML
9 INJECTION, SOLUTION INTRAVENOUS CONTINUOUS
Status: DISCONTINUED | OUTPATIENT
Start: 2025-03-13 | End: 2025-03-13 | Stop reason: HOSPADM

## 2025-03-13 RX ORDER — LIDOCAINE HYDROCHLORIDE 20 MG/ML
INJECTION, SOLUTION INFILTRATION; PERINEURAL AS NEEDED
Status: DISCONTINUED | OUTPATIENT
Start: 2025-03-13 | End: 2025-03-13 | Stop reason: SURG

## 2025-03-13 RX ORDER — FENTANYL CITRATE 50 UG/ML
INJECTION, SOLUTION INTRAMUSCULAR; INTRAVENOUS AS NEEDED
Status: DISCONTINUED | OUTPATIENT
Start: 2025-03-13 | End: 2025-03-13 | Stop reason: SURG

## 2025-03-13 RX ADMIN — FENTANYL CITRATE 50 MCG: 50 INJECTION, SOLUTION INTRAMUSCULAR; INTRAVENOUS at 07:36

## 2025-03-13 RX ADMIN — PROPOFOL 150 MCG/KG/MIN: 10 INJECTION, EMULSION INTRAVENOUS at 07:36

## 2025-03-13 RX ADMIN — KETAMINE HYDROCHLORIDE 20 MG: 10 INJECTION, SOLUTION INTRAMUSCULAR; INTRAVENOUS at 07:36

## 2025-03-13 RX ADMIN — KETOROLAC TROMETHAMINE 30 MG: 30 INJECTION, SOLUTION INTRAMUSCULAR; INTRAVENOUS at 08:07

## 2025-03-13 RX ADMIN — DEXAMETHASONE SODIUM PHOSPHATE 8 MG: 4 INJECTION INTRA-ARTICULAR; INTRALESIONAL; INTRAMUSCULAR; INTRAVENOUS; SOFT TISSUE at 06:57

## 2025-03-13 RX ADMIN — SODIUM CHLORIDE, SODIUM LACTATE, POTASSIUM CHLORIDE, AND CALCIUM CHLORIDE 9 ML/HR: 600; 310; 30; 20 INJECTION, SOLUTION INTRAVENOUS at 06:45

## 2025-03-13 RX ADMIN — CEFAZOLIN 2 G: 2 INJECTION, POWDER, FOR SOLUTION INTRAVENOUS at 07:36

## 2025-03-13 RX ADMIN — LIDOCAINE HYDROCHLORIDE 60 MG: 20 INJECTION, SOLUTION INFILTRATION; PERINEURAL at 07:36

## 2025-03-13 RX ADMIN — FAMOTIDINE 20 MG: 10 INJECTION INTRAVENOUS at 06:57

## 2025-03-13 RX ADMIN — ONDANSETRON 4 MG: 2 INJECTION INTRAMUSCULAR; INTRAVENOUS at 06:57

## 2025-03-13 RX ADMIN — KETAMINE HYDROCHLORIDE 10 MG: 10 INJECTION, SOLUTION INTRAMUSCULAR; INTRAVENOUS at 07:45

## 2025-03-13 RX ADMIN — MIDAZOLAM HYDROCHLORIDE 1 MG: 1 INJECTION, SOLUTION INTRAMUSCULAR; INTRAVENOUS at 07:22

## 2025-03-13 NOTE — ANESTHESIA PREPROCEDURE EVALUATION
Anesthesia Evaluation     Patient summary reviewed and Nursing notes reviewed   no history of anesthetic complications:   NPO Solid Status: > 8 hours  NPO Liquid Status: > 2 hours           Airway   Mallampati: II  TM distance: >3 FB  Neck ROM: full  No difficulty expected  Dental - normal exam     Pulmonary - negative pulmonary ROS and normal exam    breath sounds clear to auscultation  Cardiovascular - normal exam  Exercise tolerance: good (4-7 METS)    Rhythm: regular  Rate: normal        Neuro/Psych  (+) headaches, psychiatric history Anxiety and Depression  GI/Hepatic/Renal/Endo    (+) GERD well controlled    Musculoskeletal     Abdominal  - normal exam   Substance History   (+) alcohol use, drug use (THC)     OB/GYN negative ob/gyn ROS         Other   arthritis,                 Anesthesia Plan    ASA 2     MAC and general     intravenous induction     Anesthetic plan, risks, benefits, and alternatives have been provided, discussed and informed consent has been obtained with: patient.  Pre-procedure education provided  Use of blood products discussed with patient  Consented to blood products.      CODE STATUS:

## 2025-03-13 NOTE — OP NOTE
GENERAL SURGERY :  Chandrakant Jordan Lauren Amatot  2000    Procedure Date: 03/13/25    Pre-op Diagnosis: Dysplastic skin lesions of the back    Post-op Diagnosis: Dysplastic skin lesions of the back x 6    Procedure: Excision of dysplastic skin lesion of the back    Surgeon: Chandrakant    Assistant:   Valeri Luis was responsible for performing the following activities: Closing and Placing Dressing and their skilled assistance was necessary for the success of this case.      Estimated Blood Loss: Minimal    Complications: None    Specimen: There were a total of 6 areas taken.  They were labeled as follows: Left shoulder, top superior, bottom of back, right superior spine, midline spine and left lower midline.    Findings: The left shoulder lesion measured 0.6 cm x 1 cm x 0.5 cm.  The top superior lesion measured 1 cm x 0.5 cm x 0.5 cm.  The bottom back lesion measured 1.5 cm x 1 cm x 0.5 cm.  The right superior spine measured 1 cm x 1 cm x 0.6 cm.  The left lower midline measured 1.5 cm x 0.7 cm x 1 cm.  The midline spine measured 1 cm x 1 cm x 0.5 cm.  All were excised in their entirety.    Clinical Note: Nikki presented to my office with multiple lesions she wanted removed.  We discussed the benefits risks of surgery.  All of her questions were answered and she was willing to proceed.    Procedure: Nikki was taken the operative suite and placed in the right lateral decubitus position.  She was given MAC anesthesia with appropriate cardiopulmonary.  She was prepped and draped in the usual sterile fashion.  After appropriate timeout was performed local was injected by all the lesions.  Then using scalpel and Bovie cautery all of the lesions were excised in elliptical fashion.  More local was injected.  All incisions were then closed using 4-0 nylon simple sutures.  Antibiotic ointment sterile dressings were then applied.  Anesthesia was then brought to the recovery room in stable postoperative condition  having tolerated her procedure well.        Angela Stallings DO  08:09 EDT

## 2025-03-13 NOTE — ANESTHESIA POSTPROCEDURE EVALUATION
Patient: Nikki Varghese    Procedure Summary       Date: 03/13/25 Room / Location:  [facility] /  [facility]    Anesthesia Start: 0730 Anesthesia Stop: 0828    Procedure: Excision of multiple dysplastic lesions of the back (Back) Diagnosis:       Dysplastic skin lesion      (Dysplastic skin lesion [L98.8])    Surgeons: Angela Stallings DO Provider: Naresh Herr CRNA    Anesthesia Type: MAC, general ASA Status: 2            Anesthesia Type: MAC, general    Vitals  Vitals Value Taken Time   BP 96/55 03/13/25 08:50   Temp 98.1 °F (36.7 °C) 03/13/25 08:35   Pulse 82 03/13/25 08:50   Resp 16 03/13/25 08:50   SpO2 99 % 03/13/25 08:50           Post Anesthesia Care and Evaluation    Patient location during evaluation: PHASE II  Patient participation: complete - patient participated  Level of consciousness: awake and alert  Pain score: 0  Pain management: adequate    Airway patency: patent  Anesthetic complications: No anesthetic complications  PONV Status: none  Cardiovascular status: acceptable  Respiratory status: acceptable  Hydration status: acceptable

## 2025-03-13 NOTE — H&P
Nikki Varghese 24 y.o. female presents as a self ref for eval of 5 skin lesions on back.  Pt reports they bleed when washing or scratching her back and would like to discuss excision.              HPI   Above noted agree.  Anesthesia is well-known to my service.  I removed skin lesions approximately 5 years ago.  She is here with multiple dysplastic lesions on her back that she would like removed.        Review of Systems   All other systems reviewed and are negative.                Current Medications      Current Outpatient Medications:     citalopram (CeleXA) 40 MG tablet, Take 1 tablet by mouth Daily., Disp: , Rfl:               Allergies   No Known Allergies              Medical History        Past Medical History:   Diagnosis Date    Headache                    Surgical History         Past Surgical History:   Procedure Laterality Date    MOLE REMOVAL                      Social History   Social History           Tobacco Use    Smoking status: Never    Smokeless tobacco: Never   Substance Use Topics    Alcohol use: No    Drug use: No                       Immunization History   Administered Date(s) Administered    COVID-19 (PFIZER) Purple Cap Monovalent 08/12/2021, 09/02/2021               Physical Exam  Vitals and nursing note reviewed.   Constitutional:       Appearance: Normal appearance.   Cardiovascular:      Rate and Rhythm: Normal rate and regular rhythm.   Pulmonary:      Effort: Pulmonary effort is normal.      Breath sounds: Normal breath sounds.   Skin:     Comments: Nikki has greater than 5 dysplastic appearing lesions on her back varying in size from 1 cm to 0.5 cm.   Neurological:      General: No focal deficit present.      Mental Status: She is alert and oriented to person, place, and time.   Psychiatric:         Mood and Affect: Mood normal.         Behavior: Behavior normal.            Debilities/Disabilities Identified: None     Emotional Behavior: Appropriate        /64    "Pulse 72   Resp 16   Ht 160 cm (63\")   Wt 60.8 kg (134 lb)   BMI 23.74 kg/m²            Diagnoses and all orders for this visit:     1. Dysplastic skin lesion (Primary)     Due to the large number of lesions we discussed excising them in surgery where we can use sedation.  We discussed the benefits and risks.  All of her questions were answered and she is willing to proceed.     Thank you for allowing me to participate in the care of this interesting patient.      "

## 2025-03-14 LAB
CYTO UR: NORMAL
LAB AP CASE REPORT: NORMAL
PATH REPORT.FINAL DX SPEC: NORMAL
PATH REPORT.GROSS SPEC: NORMAL

## 2025-03-21 ENCOUNTER — OFFICE VISIT (OUTPATIENT)
Dept: SURGERY | Facility: CLINIC | Age: 25
End: 2025-03-21
Payer: COMMERCIAL

## 2025-03-21 DIAGNOSIS — Z98.890 STATUS POST EXCISIONAL BIOPSY: Primary | ICD-10-CM

## 2025-03-21 PROBLEM — L98.8 DYSPLASTIC SKIN LESION: Status: RESOLVED | Noted: 2025-02-07 | Resolved: 2025-03-21

## 2025-03-21 NOTE — PROGRESS NOTES
Nikki Varghese 24 y.o. female presents for PO FU exc. Skin lesions on back/suture removal.     History of Present Illness  The patient is a 24-year-old female here for follow-up for skin lesion removal.    She reports experiencing pruritus in the areas where the lesions were excised. She does not typically use sunblock, except during her visits to [place]. She has been utilizing tanning beds and currently does not have a dermatologist.  Additionally, she mentions an inability to stretch her back due to concerns about potential strain on the healing areas.      Review of Systems        Past Medical History:   Diagnosis Date    Acid reflux     Bulging lumbar disc     Headache     Lumbar herniated disc     Migraine     Pars defect of lumbar spine     Patella kalpesh     bilateral knees           Past Surgical History:   Procedure Laterality Date    EXCISION MASS TRUNK N/A 3/13/2025    Procedure: Excision of multiple dysplastic lesions of the back;  Surgeon: Angela Stallings DO;  Location: [facility];  Service: General;  Laterality: N/A;    MOLE REMOVAL      WISDOM TOOTH EXTRACTION             Physical Exam  Constitutional:       Appearance: Normal appearance.   Skin:     Comments: Incisions well-healed and sutures removed without difficulty   Neurological:      General: No focal deficit present.      Mental Status: She is alert and oriented to person, place, and time.   Psychiatric:         Mood and Affect: Mood normal.         Behavior: Behavior normal.       Physical Exam        LMP 02/07/2025         Diagnoses and all orders for this visit:    1. Status post excisional biopsy (Primary)      Assessment & Plan  1. Post-procedural follow-up for skin lesion excision.  The excised lesions were not malignant but were in a transitional stage towards malignancy. She was advised to apply sunblock when exposed to sunlight and to avoid the use of tanning beds. A referral to a dermatologist was suggested for a  comprehensive body mole examination and annual follow-ups. She was reassured that it is safe to stretch her back now, but she should proceed with caution if she experiences any discomfort.    PROCEDURE  The patient had skin lesions excised previously.    Patient or patient representative verbalized consent for the use of Ambient Listening during the visit with  Angela Stallings DO for chart documentation. 3/21/2025  08:20 EDT

## 2025-03-21 NOTE — LETTER
March 21, 2025     Jimbo Lopez MD  [address] [place], [place] [id number]    Patient: Nikki Varghese   YOB: 2000   Date of Visit: 3/21/2025     Dear Jimbo Lopez:       Thank you for referring Nikki Varghese to me for evaluation. Below are the relevant portions of my assessment and plan of care.    If you have questions, please do not hesitate to call me. I look forward to following Nikki along with you.         Sincerely,        Angela Stallings DO        CC: No Recipients    Angela Stallings DO  03/21/25 0820  Sign when Signing Visit  Nikki Varghese 24 y.o. female presents for PO FU exc. Skin lesions on back/suture removal.     History of Present Illness  The patient is a 24-year-old female here for follow-up for skin lesion removal.    She reports experiencing pruritus in the areas where the lesions were excised. She does not typically use sunblock, except during her visits to [place]. She has been utilizing tanning beds and currently does not have a dermatologist.  Additionally, she mentions an inability to stretch her back due to concerns about potential strain on the healing areas.      Review of Systems        Past Medical History:   Diagnosis Date   • Acid reflux    • Bulging lumbar disc    • Headache    • Lumbar herniated disc    • Migraine    • Pars defect of lumbar spine    • Patella kalpesh     bilateral knees           Past Surgical History:   Procedure Laterality Date   • EXCISION MASS TRUNK N/A 3/13/2025    Procedure: Excision of multiple dysplastic lesions of the back;  Surgeon: Angela Stallings DO;  Location: [facility];  Service: General;  Laterality: N/A;   • MOLE REMOVAL     • WISDOM TOOTH EXTRACTION             Physical Exam  Constitutional:       Appearance: Normal appearance.   Skin:     Comments: Incisions well-healed and sutures removed without difficulty   Neurological:      General: No focal deficit present.      Mental Status: She is  alert and oriented to person, place, and time.   Psychiatric:         Mood and Affect: Mood normal.         Behavior: Behavior normal.       Physical Exam        LMP 02/07/2025         Diagnoses and all orders for this visit:    1. Status post excisional biopsy (Primary)      Assessment & Plan  1. Post-procedural follow-up for skin lesion excision.  The excised lesions were not malignant but were in a transitional stage towards malignancy. She was advised to apply sunblock when exposed to sunlight and to avoid the use of tanning beds. A referral to a dermatologist was suggested for a comprehensive body mole examination and annual follow-ups. She was reassured that it is safe to stretch her back now, but she should proceed with caution if she experiences any discomfort.    PROCEDURE  The patient had skin lesions excised previously.    Patient or patient representative verbalized consent for the use of Ambient Listening during the visit with  Angela Stallings DO for chart documentation. 3/21/2025  08:20 EDT

## 2025-03-24 ENCOUNTER — TELEPHONE (OUTPATIENT)
Dept: SURGERY | Facility: CLINIC | Age: 25
End: 2025-03-24
Payer: COMMERCIAL

## 2025-03-24 NOTE — TELEPHONE ENCOUNTER
Rec'd message from pt stating her incisions popped open. Spoke with pt and she reports her prov cleaned the incisions and added steri strips.

## (undated) DEVICE — SPNG GZ WOVN 4X4IN 12PLY 10/BX STRL

## (undated) DEVICE — LINER SURG CANSTR SXN S/RIGD 1500CC

## (undated) DEVICE — LAG MINOR PROCEDURE: Brand: MEDLINE INDUSTRIES, INC.

## (undated) DEVICE — GLV SURG SENSICARE W/ALOE PF LF 6.5 STRL

## (undated) DEVICE — SUT ETHLN 4/0 PS2 18IN 1667H

## (undated) DEVICE — SOL IRR H2O BO 1000ML STRL

## (undated) DEVICE — APPL CHLORAPREP HI/LITE 26ML ORNG

## (undated) DEVICE — SUT MNCRYL 2/0 SH 27IN Y417H

## (undated) DEVICE — TRANSFER SET 3": Brand: MEDLINE INDUSTRIES, INC.